# Patient Record
Sex: FEMALE | Race: BLACK OR AFRICAN AMERICAN | NOT HISPANIC OR LATINO | Employment: UNEMPLOYED | URBAN - METROPOLITAN AREA
[De-identification: names, ages, dates, MRNs, and addresses within clinical notes are randomized per-mention and may not be internally consistent; named-entity substitution may affect disease eponyms.]

---

## 2022-04-11 ENCOUNTER — OFFICE VISIT (OUTPATIENT)
Dept: MIDWIFE SERVICES | Facility: CLINIC | Age: 22
End: 2022-04-11
Payer: COMMERCIAL

## 2022-04-11 VITALS
DIASTOLIC BLOOD PRESSURE: 82 MMHG | HEIGHT: 67 IN | BODY MASS INDEX: 24.56 KG/M2 | WEIGHT: 156.5 LBS | HEART RATE: 76 BPM | SYSTOLIC BLOOD PRESSURE: 118 MMHG

## 2022-04-11 DIAGNOSIS — Z13.9 ENCOUNTER FOR SCREENING INVOLVING SOCIAL DETERMINANTS OF HEALTH (SDOH): ICD-10-CM

## 2022-04-11 DIAGNOSIS — N76.0 BV (BACTERIAL VAGINOSIS): ICD-10-CM

## 2022-04-11 DIAGNOSIS — Z11.3 ROUTINE SCREENING FOR STI (SEXUALLY TRANSMITTED INFECTION): ICD-10-CM

## 2022-04-11 DIAGNOSIS — B96.89 BV (BACTERIAL VAGINOSIS): ICD-10-CM

## 2022-04-11 DIAGNOSIS — Z00.00 ANNUAL PHYSICAL EXAM: Primary | ICD-10-CM

## 2022-04-11 DIAGNOSIS — B37.31 YEAST INFECTION OF THE VAGINA: ICD-10-CM

## 2022-04-11 LAB
CLUE CELLS: PRESENT
HIV 1+2 AB+HIV1 P24 AG SERPL QL IA: NEGATIVE
TRICHOMONAS, WET PREP: ABNORMAL
WBC'S/HIGH POWER FIELD, WET PREP: ABNORMAL
YEAST, WET PREP: ABNORMAL

## 2022-04-11 PROCEDURE — 87591 N.GONORRHOEAE DNA AMP PROB: CPT | Performed by: MIDWIFE

## 2022-04-11 PROCEDURE — 99385 PREV VISIT NEW AGE 18-39: CPT | Performed by: MIDWIFE

## 2022-04-11 PROCEDURE — 86780 TREPONEMA PALLIDUM: CPT | Performed by: MIDWIFE

## 2022-04-11 PROCEDURE — 36415 COLL VENOUS BLD VENIPUNCTURE: CPT | Performed by: MIDWIFE

## 2022-04-11 PROCEDURE — 87086 URINE CULTURE/COLONY COUNT: CPT | Performed by: MIDWIFE

## 2022-04-11 PROCEDURE — 87389 HIV-1 AG W/HIV-1&-2 AB AG IA: CPT | Performed by: MIDWIFE

## 2022-04-11 PROCEDURE — 87210 SMEAR WET MOUNT SALINE/INK: CPT | Performed by: MIDWIFE

## 2022-04-11 PROCEDURE — 86803 HEPATITIS C AB TEST: CPT | Performed by: MIDWIFE

## 2022-04-11 PROCEDURE — 87491 CHLMYD TRACH DNA AMP PROBE: CPT | Performed by: MIDWIFE

## 2022-04-11 RX ORDER — FLUCONAZOLE 150 MG/1
150 TABLET ORAL ONCE
Qty: 1 TABLET | Refills: 0 | Status: SHIPPED | OUTPATIENT
Start: 2022-04-11 | End: 2022-04-11

## 2022-04-11 RX ORDER — METRONIDAZOLE 500 MG/1
500 TABLET ORAL 2 TIMES DAILY
Qty: 14 TABLET | Refills: 0 | Status: SHIPPED | OUTPATIENT
Start: 2022-04-11 | End: 2022-04-18

## 2022-04-11 SDOH — ECONOMIC STABILITY: TRANSPORTATION INSECURITY
IN THE PAST 12 MONTHS, HAS THE LACK OF TRANSPORTATION KEPT YOU FROM MEDICAL APPOINTMENTS OR FROM GETTING MEDICATIONS?: YES

## 2022-04-11 SDOH — ECONOMIC STABILITY: INCOME INSECURITY: IN THE LAST 12 MONTHS, WAS THERE A TIME WHEN YOU WERE NOT ABLE TO PAY THE MORTGAGE OR RENT ON TIME?: YES

## 2022-04-11 SDOH — ECONOMIC STABILITY: TRANSPORTATION INSECURITY
IN THE PAST 12 MONTHS, HAS LACK OF TRANSPORTATION KEPT YOU FROM MEETINGS, WORK, OR FROM GETTING THINGS NEEDED FOR DAILY LIVING?: YES

## 2022-04-11 SDOH — ECONOMIC STABILITY: FOOD INSECURITY: WITHIN THE PAST 12 MONTHS, YOU WORRIED THAT YOUR FOOD WOULD RUN OUT BEFORE YOU GOT MONEY TO BUY MORE.: OFTEN TRUE

## 2022-04-11 SDOH — ECONOMIC STABILITY: FOOD INSECURITY: WITHIN THE PAST 12 MONTHS, THE FOOD YOU BOUGHT JUST DIDN'T LAST AND YOU DIDN'T HAVE MONEY TO GET MORE.: OFTEN TRUE

## 2022-04-11 ASSESSMENT — SOCIAL DETERMINANTS OF HEALTH (SDOH): HOW HARD IS IT FOR YOU TO PAY FOR THE VERY BASICS LIKE FOOD, HOUSING, MEDICAL CARE, AND HEATING?: VERY HARD

## 2022-04-11 NOTE — PROGRESS NOTES
"  Subjective:     Hannah Dennis is a 21 year old female who presents for an STI testing and physical exam. She is new to [facility] and [facility]. Is not planning pregnancy in the next year.    Had sex with about a week and half ago and since is having symptoms itching/burning sensation when urinating. Is concerned about STIs and would like to be tested today.       Lives with: Hannah is currently living in shelter, but is moving into her own place on Friday. Was in [place] prior, but moved back here. Does not have family in [place], but states that is a positive change.     Work: not currently, starting new [employer] job soon    Is generally healthy. States hx of anxiety/depression. Not on meds or therapy currently for anxiety or depression. Sometimes thinks needs meds for anxiety. Has major mood swings and thinks maybe has bipolar disorder. Mom and dad are both bipolar.    Is smoker and interested in quitting. Has not tried. States \"could quit if she tried.\"      Colonoscopy: N/A  Lipid Profile: No  Glucose Screen: No  Prevention of Osteoporosis: No  Last Dexa: No and N/A      Immunization History   Administered Date(s) Administered     Comvax (HIB/HepB) 03/24/2003, 04/24/2003, 08/15/2003     DTAP (<7y) 08/03/2007     HPV 03/01/2017, 01/09/2018     Hep B, Peds or Adolescent 2002     HepA-ped 2 Dose 11/05/2020     Historical DTP/aP 03/24/2003, 04/24/2003, 08/15/2003, 07/01/2004     Influenza Vaccine IM > 6 months Valent IIV4 (Alfuria,Fluzone) 01/03/2017, 11/05/2020     MMR 07/01/2004, 08/03/2007     Meningococcal (Bexsero ) 11/05/2020     Meningococcal (Menactra ) 03/01/2017     Meningococcal (Menveo ) 11/05/2020     Pneumococcal (PCV 7) 03/24/2003, 04/24/2003, 08/15/2003     Poliovirus, inactivated (IPV) 03/24/2003, 04/24/2003, 08/15/2003, 08/03/2007     Tdap (Adacel,Boostrix) 03/01/2017     Varicella 07/01/2004, 09/08/2008     Immunization status: Due for Covid    Gynecologic History  No LMP " "recorded.  Contraception: no method    Cancer screening:   Last Pap: N/A, discussed will be due at age 23          OB History   No obstetric history on file.       No current outpatient medications on file.     No past medical history on file.  No past surgical history on file.  Patient has no known allergies.  No family history on file.  Social History     Socioeconomic History     Marital status: Single     Spouse name: Not on file     Number of children: Not on file     Years of education: Not on file     Highest education level: Not on file   Occupational History     Not on file   Tobacco Use     Smoking status: Current Every Day Smoker     Types: Cigarettes     Smokeless tobacco: Never Used   Substance and Sexual Activity     Alcohol use: Never     Drug use: Yes     Types: Marijuana     Sexual activity: Not on file   Other Topics Concern     Not on file   Social History Narrative     Not on file     Social Determinants of Health       Patient has significant social determinants impacting her health. She has unstable housing, significant financial resource strain, food insecurity, lives in high stress environment, few social connections in [place] and does not have own transportation.       Review of Systems  General:  Denies problem  Eyes: Denies problem  Ears/Nose/Throat: Denies problem  Cardiovascular: Denies problem  Respiratory:  Denies problem  Gastrointestinal:  denies problems  Genitourinary: burning/itching  Musculoskeletal:  Denies problem  Skin: Denies problem  Neurologic:denies problems  Psychiatric: depression/anxiety/ mood swings  Endocrine: denies problems        Objective:      Vitals:    04/11/22 1630   BP: 118/82   Pulse: 76   Weight: 71 kg (156 lb 8 oz)   Height: 1.695 m (5' 6.75\")       Physical Exam:  General Appearance: Alert, cooperative, no distress, appears stated age  Skin: Skin color, texture, turgor normal, no rashes or lesions.  varicosities.  Throat: Lips, mucosa, and tongue normal; " teeth and gums normal  HEENT: grossly normal; otoscopic and opthalmic exam not performed.   Neck: Supple, symmetrical, trachea midline, no adenopathy;  thyroid: not enlarged, symmetric, no tenderness/mass/nodules  Lungs: Clear to auscultation bilaterally, respirations unlabored  Heart: Regular rate and rhythm, S1 and S2 normal, no murmur, rub, or gallop  Abdomen: Soft, non-tender. No organomegaly or masses  Pelvic:External genitalia normal without lesions or irritation. No abnormal discharge.     Assessment:     Healthy female exam.       Plan:      1.  Due to time constraints, unable to address all areas of concern during physical exam. Patient does not have PCP.  Advised appointment with primary care as soon as feasible to establish care. Provided basic resources in AVS. Patient also needs mental health referral. Put in referral for care coordination to help with scheduling.   2.  Labs:  UA/UC, wet prep, Hep B, HIV, RPR, GC/chlamydia and Hep C   3. Contraception: no method. Provided education on contraceptives including COCs, implants, levonorgestrel-releasing intrauterine devices (IUDs), DMPA injection, and progestin-only pills (POPs). Also  reviewed nonhormnal options including: copper IUD, barrier methods. Patient is not ready to start a birth control method at this time. Encouraged patient to use barrier method if not on Rx for birth control and emphasized that condoms can also be effective for STI prevention. Patient will think about options and discuss with PCP or call our office to schedule if desires.  5. Pap smear not done at today's visit. Due for cervical cancer screening November 2024 .   6. Referral for care coordination placed to try and mediate many social determinants of health listed on history and to help her arrange care with PCP and get connected with community resources for therapy.    Patient was seen with student, CHIQUITA Curiel who was present for learning. I personally assessed,  examined and made clinical decisions reflected in the documentation. Sandra Romero DNP,APRN,CNM

## 2022-04-12 ENCOUNTER — TELEPHONE (OUTPATIENT)
Facility: CLINIC | Age: 22
End: 2022-04-12
Payer: COMMERCIAL

## 2022-04-12 DIAGNOSIS — Z11.8 SPECIAL SCREENING EXAMINATION FOR CHLAMYDIAL DISEASE: Primary | ICD-10-CM

## 2022-04-12 DIAGNOSIS — Z59.00 HOMELESS: ICD-10-CM

## 2022-04-12 DIAGNOSIS — R45.851 SUICIDAL THOUGHTS: Primary | ICD-10-CM

## 2022-04-12 DIAGNOSIS — A74.9 CHLAMYDIA: ICD-10-CM

## 2022-04-12 LAB
C TRACH DNA SPEC QL PROBE+SIG AMP: POSITIVE
HCV AB SERPL QL IA: NEGATIVE
N GONORRHOEA DNA SPEC QL NAA+PROBE: NEGATIVE
T PALLIDUM AB SER QL: NONREACTIVE

## 2022-04-12 RX ORDER — AZITHROMYCIN 500 MG/1
1000 TABLET, FILM COATED ORAL ONCE
Qty: 2 TABLET | Refills: 0 | Status: SHIPPED | OUTPATIENT
Start: 2022-04-12 | End: 2022-04-12

## 2022-04-12 SDOH — ECONOMIC STABILITY - HOUSING INSECURITY: HOMELESSNESS UNSPECIFIED: Z59.00

## 2022-04-12 NOTE — TELEPHONE ENCOUNTER
"Called and spoke with patient regarding her PHQ9 which she completed online this morning and answered    Thoughts that you would be better off dead, or of hurting yourself in some way More than half the days     Akilah states that she is currently living in her friend's car until Friday when she will have a place to move into. She applied for Medicaid online and did not have any direct contact with services other than that. Is no longer staying in a shelter. Denies immediate need to be assessed for suicidal ideation. States she did try to harm herself \"a long time ago\" and was treated for depression but is not currently on medication or talking to anyone. This writer advised her to call  the mental health number on the back of her insurance card today to get resources for therapy and discuss re starting on meds. If she feels like harming herself again advised to call 911 or go to an emergency room to be evaluated immediately.   "

## 2022-04-13 LAB — BACTERIA UR CULT: NO GROWTH

## 2022-10-03 ENCOUNTER — HEALTH MAINTENANCE LETTER (OUTPATIENT)
Age: 22
End: 2022-10-03

## 2023-05-21 ENCOUNTER — HEALTH MAINTENANCE LETTER (OUTPATIENT)
Age: 23
End: 2023-05-21

## 2024-07-28 ENCOUNTER — HEALTH MAINTENANCE LETTER (OUTPATIENT)
Age: 24
End: 2024-07-28